# Patient Record
Sex: MALE | Race: WHITE | Employment: STUDENT | URBAN - NONMETROPOLITAN AREA
[De-identification: names, ages, dates, MRNs, and addresses within clinical notes are randomized per-mention and may not be internally consistent; named-entity substitution may affect disease eponyms.]

---

## 2022-10-31 ENCOUNTER — HOSPITAL ENCOUNTER (EMERGENCY)
Age: 22
Discharge: HOME | End: 2022-10-31
Attending: EMERGENCY MEDICINE
Payer: COMMERCIAL

## 2022-10-31 VITALS
TEMPERATURE: 97.3 F | DIASTOLIC BLOOD PRESSURE: 64 MMHG | OXYGEN SATURATION: 100 % | SYSTOLIC BLOOD PRESSURE: 123 MMHG | HEART RATE: 63 BPM | RESPIRATION RATE: 12 BRPM

## 2022-10-31 DIAGNOSIS — S01.81XA FACIAL LACERATION, INITIAL ENCOUNTER: Primary | ICD-10-CM

## 2022-10-31 PROCEDURE — 90715 TDAP VACCINE 7 YRS/> IM: CPT | Performed by: EMERGENCY MEDICINE

## 2022-10-31 PROCEDURE — 12011 RPR F/E/E/N/L/M 2.5 CM/<: CPT

## 2022-10-31 PROCEDURE — 90471 IMMUNIZATION ADMIN: CPT | Performed by: EMERGENCY MEDICINE

## 2022-10-31 PROCEDURE — 6360000002 HC RX W HCPCS: Performed by: EMERGENCY MEDICINE

## 2022-10-31 PROCEDURE — 99284 EMERGENCY DEPT VISIT MOD MDM: CPT | Performed by: EMERGENCY MEDICINE

## 2022-10-31 PROCEDURE — 2500000003 HC RX 250 WO HCPCS: Performed by: EMERGENCY MEDICINE

## 2022-10-31 RX ORDER — LIDOCAINE HYDROCHLORIDE 10 MG/ML
5 INJECTION, SOLUTION INFILTRATION; PERINEURAL ONCE
Status: COMPLETED | OUTPATIENT
Start: 2022-10-31 | End: 2022-10-31

## 2022-10-31 RX ADMIN — TETANUS TOXOID, REDUCED DIPHTHERIA TOXOID AND ACELLULAR PERTUSSIS VACCINE, ADSORBED 0.5 ML: 5; 2.5; 8; 8; 2.5 SUSPENSION INTRAMUSCULAR at 14:11

## 2022-10-31 RX ADMIN — LIDOCAINE HYDROCHLORIDE 5 ML: 10 INJECTION, SOLUTION INFILTRATION; PERINEURAL at 14:10

## 2022-10-31 ASSESSMENT — PAIN - FUNCTIONAL ASSESSMENT: PAIN_FUNCTIONAL_ASSESSMENT: NONE - DENIES PAIN

## 2022-10-31 NOTE — ED NOTES
Five sutures placed by Dr. Darleen Gomez. Wound edges well approximated. Area cleansed and covered with a band-aid.      Sunil Kimball RN  10/31/22 5718

## 2022-10-31 NOTE — ED PROVIDER NOTES
[address]  [address] [address]  Phone: [address]    Chief Complaint   Patient presents with    Laceration       HPI    Saratha Bernheim is a 25 y.o. male who presents above-noted complaint. Patient was at home doing some work had a buckle essentially hit him in the head. He has a 1.5 cm laceration to the forehead/glabella area. No loss of consciousness no vision changes or other problems      PAST MEDICAL HISTORY    History reviewed. No pertinent past medical history. SURGICAL HISTORY    Past Surgical History:   Procedure Laterality Date    CIRCUMCISION  At birth       CURRENT MEDICATIONS    Current Outpatient Rx   Medication Sig Dispense Refill    clindamycin-benzoyl peroxide (BENZACLIN) 1-5 % gel Apply topically 2 times daily. 50 g 5       ALLERGIES    No Known Allergies    FAMILY HISTORY    History reviewed. No pertinent family history. SOCIAL HISTORY    Social History     Socioeconomic History    Marital status: Single     Spouse name: None    Number of children: None    Years of education: None    Highest education level: None   Tobacco Use    Smoking status: Never    Smokeless tobacco: Never   Substance and Sexual Activity    Alcohol use: No    Drug use: No       REVIEW OF SYSTEMS    Positive for head injury without loss of consciousness vomiting. Laceration noted. No vision changes  All systems negative except as marked. PHYSICAL EXAM    VITAL SIGNS: /64   Pulse 63   Temp 97.3 °F (36.3 °C) (Temporal)   Resp 12   SpO2 100%    Constitutional:  Alert not toxtic or ill, able to give coherent history  HENT:  Normocephalic, 1.5 cm laceration to the glabella area. No step-offs or bony deformities. Nose is normal otherwise  Cervical Spine: Normal range of motion,  No stridor.    Eyes:  No discharge or  Swelling, PERRLA extraocular movements are normal  Respiratory: No respiratory distress  Musculoskeletal:  Intact distal pulses, No edema, No tenderness, No cyanosis, No clubbing. . No tenderness to palpation or major deformities noted. Integument:  Warm, Dry, No erythema, No rash (on exposed areas)   Neurologic:  Alert & appropriate   Psychiatric:  Affect normal    EKG                      RADIOLOGY    No orders to display           SCREENINGS  /64   Pulse 63   Temp 97.3 °F (36.3 °C) (Temporal)   Resp 12   SpO2 100%      No orders to display       Screening For Hypertension and Follow-up (#317)  patient informed that blood pressure is normal but should always be re-assessed by primary care      Screening For Tobacco Use and Cessation Intervention (#226):   reports that he has never smoked.  He has never used smokeless tobacco.  Non-smoker not applicable for screen            PROCEDURES    Lac Repair    Date/Time: 10/31/2022 2:22 PM  Performed by: Luli Hansen MD  Authorized by: Luli Hansen MD     Consent:     Consent obtained:  Verbal    Consent given by:  Patient  Anesthesia:     Anesthesia method:  Local infiltration    Local anesthetic:  Lidocaine 1% w/o epi  Laceration details:     Location:  Face    Face location:  Nose    Length (cm):  1.5    Depth (mm):  3  Pre-procedure details:     Preparation:  Patient was prepped and draped in usual sterile fashion  Exploration:     Limited defect created (wound extended): no      Hemostasis achieved with:  Direct pressure    Wound extent: no nerve damage noted, no tendon damage noted and no underlying fracture noted      Contaminated: no    Treatment:     Area cleansed with:  Povidone-iodine    Visualized foreign bodies/material removed: no      Debridement:  None    Scar revision: no    Skin repair:     Repair method:  Sutures    Suture size:  6-0    Suture material:  Nylon    Suture technique:  Simple interrupted    Number of sutures:  5  Approximation:     Approximation:  Close  Repair type:     Repair type: Simple  Post-procedure details:     Dressing:  Non-adherent dressing    Procedure completion:  Tolerated well, no immediate complications        CONSULTS:  None        ED COURSE & MEDICAL DECISION MAKING    Pertinent Labs & Imaging studies reviewed. (See chart for details)  Isolated facial laceration right at the glabella nose area. No other focal clinical exam.  Wound care as noted. Sutures out in 5 to 7 days. Follow-up with primary care. Tetanus was updated. REASSESSMENT  2:25 PM  Patient rechecked and updated on lab/xray status, progress and results. Patient was reassessed and condition was Improved after treatment . Needs nothing else at this time. 2:25 PM           FINAL IMPRESSION    1.  Facial laceration, initial encounter         PATIENT REFERRED TO:  Denise Davila, APRN - [facility]  [address]  [address] [place] [address]  [phone]    Call   For suture removal in 5 to 7 days    DISCHARGE MEDICATIONS:  New Prescriptions    No medications on file          Victorino Antonio MD  10/31/22 3016

## 2022-10-31 NOTE — ED NOTES
Patient presents to the ED via private auto with complaints of forehead laceration. Patient voices he was at home when he got hit by a ratchet strap. Has an approximate 1.5cm laceration nears the left eye brow on the nasal bridge. Denies pain. Unsure of last tetanus shot.      [address]  RN  10/31/22 5698

## 2022-10-31 NOTE — ED NOTES
Discharge teaching and instructions for condition explained to patient. AVS reviewed. Patient voiced understanding regarding follow up appointments and care of self at home. Pt discharged to home in stable condition per self.          Juan Gray RN  10/31/22 9379

## 2022-11-30 ENCOUNTER — OFFICE VISIT (OUTPATIENT)
Dept: FAMILY MEDICINE CLINIC | Age: 22
End: 2022-11-30
Payer: COMMERCIAL

## 2022-11-30 ENCOUNTER — HOSPITAL ENCOUNTER (OUTPATIENT)
Age: 22
Discharge: HOME | End: 2022-11-30
Payer: COMMERCIAL

## 2022-11-30 ENCOUNTER — HOSPITAL ENCOUNTER (OUTPATIENT)
Dept: GENERAL RADIOLOGY | Age: 22
Discharge: HOME | End: 2022-11-30
Payer: COMMERCIAL

## 2022-11-30 ENCOUNTER — NURSE TRIAGE (OUTPATIENT)
Dept: OTHER | Facility: CLINIC | Age: 22
End: 2022-11-30

## 2022-11-30 VITALS
SYSTOLIC BLOOD PRESSURE: 118 MMHG | BODY MASS INDEX: 27.92 KG/M2 | HEIGHT: 70 IN | OXYGEN SATURATION: 99 % | DIASTOLIC BLOOD PRESSURE: 68 MMHG | WEIGHT: 195 LBS | RESPIRATION RATE: 19 BRPM | HEART RATE: 66 BPM

## 2022-11-30 DIAGNOSIS — S89.90XA TRAUMATIC INJURY OF KNEE: ICD-10-CM

## 2022-11-30 DIAGNOSIS — M25.562 ACUTE PAIN OF LEFT KNEE: ICD-10-CM

## 2022-11-30 DIAGNOSIS — M25.562 ACUTE PAIN OF LEFT KNEE: Primary | ICD-10-CM

## 2022-11-30 DIAGNOSIS — R93.6 ABNORMAL X-RAY OF KNEE: ICD-10-CM

## 2022-11-30 PROCEDURE — 73564 X-RAY EXAM KNEE 4 OR MORE: CPT

## 2022-11-30 PROCEDURE — 99203 OFFICE O/P NEW LOW 30 MIN: CPT | Performed by: NURSE PRACTITIONER

## 2022-11-30 SDOH — ECONOMIC STABILITY: FOOD INSECURITY: WITHIN THE PAST 12 MONTHS, YOU WORRIED THAT YOUR FOOD WOULD RUN OUT BEFORE YOU GOT MONEY TO BUY MORE.: NEVER TRUE

## 2022-11-30 SDOH — ECONOMIC STABILITY: FOOD INSECURITY: WITHIN THE PAST 12 MONTHS, THE FOOD YOU BOUGHT JUST DIDN'T LAST AND YOU DIDN'T HAVE MONEY TO GET MORE.: NEVER TRUE

## 2022-11-30 ASSESSMENT — ENCOUNTER SYMPTOMS
WHEEZING: 0
STRIDOR: 0
COUGH: 0
ABDOMINAL DISTENTION: 0
DIARRHEA: 0
NAUSEA: 0
CONSTIPATION: 0
CHEST TIGHTNESS: 0
BACK PAIN: 0
COLOR CHANGE: 0
SINUS PRESSURE: 0
ABDOMINAL PAIN: 0
SHORTNESS OF BREATH: 0
VOMITING: 0
SORE THROAT: 0

## 2022-11-30 ASSESSMENT — PATIENT HEALTH QUESTIONNAIRE - PHQ9
SUM OF ALL RESPONSES TO PHQ9 QUESTIONS 1 & 2: 0
SUM OF ALL RESPONSES TO PHQ QUESTIONS 1-9: 0
2. FEELING DOWN, DEPRESSED OR HOPELESS: 0
SUM OF ALL RESPONSES TO PHQ QUESTIONS 1-9: 0
1. LITTLE INTEREST OR PLEASURE IN DOING THINGS: 0

## 2022-11-30 ASSESSMENT — SOCIAL DETERMINANTS OF HEALTH (SDOH): HOW HARD IS IT FOR YOU TO PAY FOR THE VERY BASICS LIKE FOOD, HOUSING, MEDICAL CARE, AND HEATING?: NOT HARD AT ALL

## 2022-11-30 NOTE — TELEPHONE ENCOUNTER
Location of patient: [facility] call from J[facility] at [facility] with [employer]. Subjective: Caller states \"I had a knee injury\"     Current Symptoms: Fell onto his L kneecap 2-3 times last week. Swollen and bruised. Bruising is going from knee cap down to his ankle. No injury to the lower extremity. Swelling is going down. Able to walk. Jogging without difficulty. Stiffness. Onset: 1 week ago; worsening    Associated Symptoms: NA    Pain Severity: 4/10; stiffness, pain with standing for long periods of time. waxing and waning, moderate    Temperature: denies fever     What has been tried: Ibuprofen and Ice-helps    LMP: NA Pregnant: NA    Recommended disposition: See in Office Today/UCC or Walk In    Care advice provided, patient verbalizes understanding; denies any other questions or concerns; instructed to call back for any new or worsening symptoms. Patient/Caller agrees with recommended disposition; writer provided warm transfer to [employer] at [facility] for appointment scheduling. Attention Provider: Thank you for allowing me to participate in the care of your patient. The patient was connected to triage in response to information provided to the ECC/PSC. Please do not respond through this encounter as the response is not directed to a shared pool.     Reason for Disposition   Large swelling or bruise and size > palm of person's hand    Protocols used: Knee Injury-ADULT-[place]

## 2022-11-30 NOTE — PROGRESS NOTES
Leonela Hewitt (:  2000) is a 25 y.o. male,Established patient, here for evaluation of the following chief complaint(s):  New Patient (Left knee injury, last week. swelling. )         ASSESSMENT/PLAN:  1. Acute pain of left knee  -     XR KNEE LEFT (MIN 4 VIEWS); Future    Obtain xray  Ice and elevate  Rest knee  Consider MRI if normal xray    Return if symptoms worsen or fail to improve. Subjective   SUBJECTIVE/OBJECTIVE:  HPI    Left knee pain and bruising. Happened playing football last wed. Clementina Lovelaceer on it multiple times and possibly twisted it. Still having pain but is walking on it. Still very swollen. No prior knee injuries or surgeries. Feels a unstable. Is having some problems walking on leg. Bruising goes all the way around the knee. Review of Systems   Constitutional:  Negative for activity change, appetite change, chills, diaphoresis, fatigue, fever and unexpected weight change. HENT:  Negative for congestion, ear pain, postnasal drip, sinus pressure and sore throat. Eyes:  Negative for visual disturbance. Respiratory:  Negative for cough, chest tightness, shortness of breath, wheezing and stridor. Cardiovascular:  Negative for chest pain, palpitations and leg swelling. Gastrointestinal:  Negative for abdominal distention, abdominal pain, constipation, diarrhea, nausea and vomiting. Endocrine: Negative for polydipsia, polyphagia and polyuria. Genitourinary:  Negative for decreased urine volume, difficulty urinating, dysuria, flank pain, frequency, hematuria and urgency. Musculoskeletal:  Positive for arthralgias, gait problem and joint swelling. Negative for back pain, myalgias and neck pain. Skin:  Negative for color change, pallor and rash. Neurological:  Negative for dizziness, syncope, weakness, light-headedness, numbness and headaches. Hematological:  Negative for adenopathy.    Psychiatric/Behavioral:  Negative for behavioral problems, self-injury and sleep disturbance. The patient is not nervous/anxious. Objective   Physical Exam  Vitals reviewed. Constitutional:       General: He is not in acute distress. Appearance: Normal appearance. He is well-developed. HENT:      Head: Normocephalic. Right Ear: External ear normal.      Left Ear: External ear normal.      Nose: Nose normal.      Right Sinus: No maxillary sinus tenderness. Left Sinus: No maxillary sinus tenderness. Mouth/Throat:      Pharynx: Uvula midline. Neck:      Trachea: Trachea normal.   Cardiovascular:      Rate and Rhythm: Normal rate and regular rhythm. Heart sounds: Normal heart sounds. No murmur heard. Pulmonary:      Effort: Pulmonary effort is normal. No respiratory distress. Breath sounds: Normal breath sounds. No decreased breath sounds, wheezing, rhonchi or rales. Chest:      Chest wall: No tenderness. Abdominal:      General: There is no distension. Palpations: Abdomen is soft. There is no mass. Tenderness: There is no abdominal tenderness. Musculoskeletal:         General: Swelling, tenderness and signs of injury present. No deformity. Normal range of motion. Cervical back: Normal range of motion and neck supple. Left knee: Swelling and ecchymosis present. Tenderness present over the medial joint line. No patellar tendon tenderness. Lymphadenopathy:      Cervical: No cervical adenopathy. Skin:     General: Skin is warm and dry. Neurological:      Mental Status: He is alert and oriented to person, place, and time. Motor: No abnormal muscle tone. Coordination: Coordination normal.      Gait: Gait normal.   Psychiatric:         Mood and Affect: Mood normal.         Behavior: Behavior normal.         Thought Content:  Thought content normal.         Judgment: Judgment normal.          On this date 11/30/2022 I have spent 31 minutes reviewing previous notes, test results and face to face with the patient discussing the diagnosis and importance of compliance with the treatment plan as well as documenting on the day of the visit. An electronic signature was used to authenticate this note.     --ABDON Negro - CNP

## 2022-12-15 ENCOUNTER — TELEPHONE (OUTPATIENT)
Dept: FAMILY MEDICINE CLINIC | Age: 22
End: 2022-12-15

## 2022-12-15 NOTE — TELEPHONE ENCOUNTER
Per Dara, the MRI Knee ordered for this patient is pending denial for a peer to peer as the clinical information does not support medical necessity for the ordered test. All available clinical has been submitted at this time. Peer to peer available  #   Case# [id number]    Please advise. *Patient scheduled tomorrow 12/16*  If you speak with the patient, and the test is going to be cancelled please also cancel the test with central scheduling. Clinical Rationale: Your doctor is checking you for a ligament tear in your knee. Your doctor ordered an MRI of your knee. An MRI is a way to take pictures of the inside of your body. This test should be used when your condition has not improved after four weeks of treatment by your doctor. Treatment should include medications and other forms of therapy. These need to include home exercises or physical therapy. Your doctor needs to have seen you in the office after treatment.

## 2022-12-15 NOTE — TELEPHONE ENCOUNTER
Kash Florence from [facility] did reach out regarding the MRI that is scheduled for tomorrow. I advised that I will consult the providers in our office and cancel as appropriate.

## 2024-07-02 ASSESSMENT — PATIENT HEALTH QUESTIONNAIRE - PHQ9
1. LITTLE INTEREST OR PLEASURE IN DOING THINGS: SEVERAL DAYS
SUM OF ALL RESPONSES TO PHQ QUESTIONS 1-9: 1
SUM OF ALL RESPONSES TO PHQ9 QUESTIONS 1 & 2: 1
SUM OF ALL RESPONSES TO PHQ QUESTIONS 1-9: 1
1. LITTLE INTEREST OR PLEASURE IN DOING THINGS: SEVERAL DAYS
2. FEELING DOWN, DEPRESSED OR HOPELESS: NOT AT ALL
SUM OF ALL RESPONSES TO PHQ9 QUESTIONS 1 & 2: 1
2. FEELING DOWN, DEPRESSED OR HOPELESS: NOT AT ALL

## 2024-07-03 ENCOUNTER — OFFICE VISIT (OUTPATIENT)
Dept: FAMILY MEDICINE CLINIC | Age: 24
End: 2024-07-03
Payer: COMMERCIAL

## 2024-07-03 VITALS
HEIGHT: 70 IN | OXYGEN SATURATION: 99 % | DIASTOLIC BLOOD PRESSURE: 68 MMHG | WEIGHT: 215 LBS | BODY MASS INDEX: 30.78 KG/M2 | RESPIRATION RATE: 16 BRPM | SYSTOLIC BLOOD PRESSURE: 122 MMHG | HEART RATE: 74 BPM

## 2024-07-03 DIAGNOSIS — Z00.00 ENCOUNTER FOR WELL ADULT EXAM WITHOUT ABNORMAL FINDINGS: Primary | ICD-10-CM

## 2024-07-03 PROCEDURE — 99395 PREV VISIT EST AGE 18-39: CPT

## 2024-07-03 SDOH — ECONOMIC STABILITY: FOOD INSECURITY: WITHIN THE PAST 12 MONTHS, THE FOOD YOU BOUGHT JUST DIDN'T LAST AND YOU DIDN'T HAVE MONEY TO GET MORE.: NEVER TRUE

## 2024-07-03 SDOH — ECONOMIC STABILITY: HOUSING INSECURITY
IN THE LAST 12 MONTHS, WAS THERE A TIME WHEN YOU DID NOT HAVE A STEADY PLACE TO SLEEP OR SLEPT IN A SHELTER (INCLUDING NOW)?: NO

## 2024-07-03 SDOH — ECONOMIC STABILITY: FOOD INSECURITY: WITHIN THE PAST 12 MONTHS, YOU WORRIED THAT YOUR FOOD WOULD RUN OUT BEFORE YOU GOT MONEY TO BUY MORE.: NEVER TRUE

## 2024-07-03 SDOH — ECONOMIC STABILITY: INCOME INSECURITY: HOW HARD IS IT FOR YOU TO PAY FOR THE VERY BASICS LIKE FOOD, HOUSING, MEDICAL CARE, AND HEATING?: NOT HARD AT ALL

## 2024-07-03 ASSESSMENT — ENCOUNTER SYMPTOMS
ABDOMINAL PAIN: 0
EYE PAIN: 0
DIARRHEA: 0
VOMITING: 0
ABDOMINAL DISTENTION: 0
SORE THROAT: 0
CHEST TIGHTNESS: 0
SINUS PRESSURE: 0
COLOR CHANGE: 0
BACK PAIN: 0
COUGH: 0
SHORTNESS OF BREATH: 0
WHEEZING: 0
NAUSEA: 0

## 2024-07-03 NOTE — PROGRESS NOTES
Well Adult Note  Name: Jose Antonio Cohn Today’s Date: 7/3/2024   MRN: [id number] Sex: Male   Age: 24 y.o. Ethnicity: Non- / Non    : 2000 Race: White (non-)      Jose Antonio Cohn is here for well adult exam.    Doing well with no concerns.    Review of Systems   Constitutional:  Negative for activity change, appetite change, chills, fatigue, fever and unexpected weight change.   HENT:  Negative for congestion, ear pain, hearing loss, postnasal drip, sinus pressure and sore throat.    Eyes:  Negative for pain and visual disturbance.   Respiratory:  Negative for cough, chest tightness, shortness of breath and wheezing.    Cardiovascular:  Negative for chest pain, palpitations and leg swelling.   Gastrointestinal:  Negative for abdominal distention, abdominal pain, diarrhea, nausea and vomiting.   Genitourinary:  Negative for difficulty urinating, dysuria, flank pain, frequency, scrotal swelling, testicular pain and urgency.   Musculoskeletal:  Negative for arthralgias, back pain, gait problem, joint swelling, myalgias, neck pain and neck stiffness.   Skin:  Negative for color change and rash.   Neurological:  Negative for dizziness, weakness, light-headedness, numbness and headaches.   Psychiatric/Behavioral:  Negative for behavioral problems, dysphoric mood, self-injury, sleep disturbance and suicidal ideas. The patient is not nervous/anxious.        No Known Allergies      Prior to Visit Medications    Not on File       History reviewed. No pertinent past medical history.    Past Surgical History:   Procedure Laterality Date    CIRCUMCISION  At birth       History reviewed. No pertinent family history.    Social History     Tobacco Use    Smoking status: Never    Smokeless tobacco: Never   Substance Use Topics    Alcohol use: Yes     Alcohol/week: 10.0 standard drinks of alcohol     Types: 10 Cans of beer per week    Drug use: Never       Objective     Vital Signs  /68 (Site: Right Upper